# Patient Record
Sex: MALE | URBAN - METROPOLITAN AREA
[De-identification: names, ages, dates, MRNs, and addresses within clinical notes are randomized per-mention and may not be internally consistent; named-entity substitution may affect disease eponyms.]

---

## 2021-11-18 ENCOUNTER — OFFICE VISIT (OUTPATIENT)
Dept: URBAN - METROPOLITAN AREA CLINIC 128 | Facility: CLINIC | Age: 31
End: 2021-11-18
Payer: COMMERCIAL

## 2021-11-18 DIAGNOSIS — K52.9 COLITIS: ICD-10-CM

## 2021-11-18 DIAGNOSIS — R10.84 ABDOMINAL CRAMPING, GENERALIZED: ICD-10-CM

## 2021-11-18 DIAGNOSIS — R19.7 ACUTE DIARRHEA: ICD-10-CM

## 2021-11-18 DIAGNOSIS — R93.5 ABNORMAL ABDOMINAL CT SCAN: ICD-10-CM

## 2021-11-18 PROCEDURE — 99203 OFFICE O/P NEW LOW 30 MIN: CPT | Performed by: INTERNAL MEDICINE

## 2021-11-18 RX ORDER — METRONIDAZOLE 500 MG/1
1 TABLET TABLET ORAL THREE TIMES A DAY
Qty: 30 | OUTPATIENT
Start: 2021-11-18 | End: 2021-11-28

## 2021-11-18 RX ORDER — CIPROFLOXACIN HYDROCHLORIDE 500 MG/1
1 TABLET TABLET, FILM COATED ORAL TWICE A DAY
Qty: 14 TABLET | Refills: 1 | OUTPATIENT
Start: 2021-11-18 | End: 2021-12-02

## 2021-11-18 RX ORDER — MESALAMINE 1.2 G/1
2 TABLETS TABLET, DELAYED RELEASE ORAL ONCE A DAY
Qty: 180 TABLET | Refills: 0 | OUTPATIENT
Start: 2021-11-18 | End: 2022-02-16

## 2021-11-18 NOTE — HPI-TODAY'S VISIT:
Mr. Wood is a pleasant 30yo gentleman with chronic diarrhea for over 9 months, seen in ER 03/2021 with CT positive for colitis of hepatic flexure.  He was seen by [facility] who rec'd colonoscopy but this was not performed.  No stool studies have been performed.  NO overt bleeding except small blood on tissue on occasion.  Diarrhea is assoc with pain and cramping 5-6 times a day, often after meals.  No N/V or weight loss. No new rash or arthritis, eye pain or redness, no pruritis.  No family hx of IBD.  He does take antibiotics several times a year.  There is hx of antibiotics about 8 weeks ago for URI.  He has recurrent UTI for which antibiotics are required as well.  He is s/p appendectomy--no other abdominal surgeries.  No hx of prior C difficile colitis.  No hx of heart, lung, or renal disease.

## 2022-02-02 ENCOUNTER — OFFICE VISIT (OUTPATIENT)
Dept: URBAN - METROPOLITAN AREA SURGERY CENTER 31 | Facility: SURGERY CENTER | Age: 32
End: 2022-02-02

## 2022-02-02 ENCOUNTER — TELEPHONE ENCOUNTER (OUTPATIENT)
Dept: URBAN - METROPOLITAN AREA CLINIC 128 | Facility: CLINIC | Age: 32
End: 2022-02-02

## 2022-02-02 RX ORDER — MESALAMINE 1.2 G/1
2 TABLETS TABLET, DELAYED RELEASE ORAL ONCE A DAY
Qty: 180 TABLET | Refills: 0 | Status: ACTIVE | COMMUNITY
Start: 2021-11-18 | End: 2022-02-16

## 2024-03-20 PROBLEM — 15634181000119107: Status: ACTIVE | Noted: 2024-03-20

## 2024-03-20 PROBLEM — 64226004: Status: ACTIVE | Noted: 2024-03-20

## 2024-03-20 PROBLEM — 197927001: Status: ACTIVE | Noted: 2024-03-20

## 2024-03-20 PROBLEM — 21522001: Status: ACTIVE | Noted: 2024-03-20

## 2024-03-20 PROBLEM — 448661001: Status: ACTIVE | Noted: 2024-03-20

## 2024-03-20 PROBLEM — 448265002: Status: ACTIVE | Noted: 2024-03-20

## 2024-03-20 PROBLEM — 236071009: Status: ACTIVE | Noted: 2024-03-20

## 2024-03-21 ENCOUNTER — LAB (OUTPATIENT)
Dept: URBAN - METROPOLITAN AREA CLINIC 19 | Facility: CLINIC | Age: 34
End: 2024-03-21

## 2024-03-21 ENCOUNTER — OV EP (OUTPATIENT)
Dept: URBAN - METROPOLITAN AREA CLINIC 19 | Facility: CLINIC | Age: 34
End: 2024-03-21
Payer: COMMERCIAL

## 2024-03-21 VITALS
OXYGEN SATURATION: 98 % | SYSTOLIC BLOOD PRESSURE: 130 MMHG | TEMPERATURE: 97.9 F | HEIGHT: 72 IN | DIASTOLIC BLOOD PRESSURE: 80 MMHG | HEART RATE: 67 BPM | WEIGHT: 222.6 LBS | BODY MASS INDEX: 30.15 KG/M2

## 2024-03-21 DIAGNOSIS — K52.9 COLITIS: ICD-10-CM

## 2024-03-21 DIAGNOSIS — R10.811 RIGHT UPPER QUADRANT ABDOMINAL TENDERNESS WITHOUT REBOUND TENDERNESS: ICD-10-CM

## 2024-03-21 DIAGNOSIS — R93.5 ABNORMAL ABDOMINAL CT SCAN: ICD-10-CM

## 2024-03-21 DIAGNOSIS — K52.9 CHRONIC DIARRHEA: ICD-10-CM

## 2024-03-21 DIAGNOSIS — N39.0 RECURRENT UTI: ICD-10-CM

## 2024-03-21 DIAGNOSIS — R10.813 RIGHT LOWER QUADRANT ABDOMINAL TENDERNESS WITHOUT REBOUND TENDERNESS: ICD-10-CM

## 2024-03-21 DIAGNOSIS — R10.9 ABDOMINAL PAIN OF MULTIPLE SITES: ICD-10-CM

## 2024-03-21 PROCEDURE — 99204 OFFICE O/P NEW MOD 45 MIN: CPT | Performed by: STUDENT IN AN ORGANIZED HEALTH CARE EDUCATION/TRAINING PROGRAM

## 2024-03-21 RX ORDER — ONDANSETRON 4 MG/1
1 TABLET ON THE TONGUE AND ALLOW TO DISSOLVE TABLET, ORALLY DISINTEGRATING ORAL ONCE A DAY
Qty: 10 TABLET | Refills: 1 | OUTPATIENT
Start: 2024-03-21

## 2024-03-21 RX ORDER — NAPROXEN 500 MG/1
1 TABLET WITH FOOD OR MILK AS NEEDED TABLET ORAL
Status: ON HOLD | COMMUNITY

## 2024-03-21 RX ORDER — HYOSCYAMINE SULFATE 0.12 MG/1
1 TABLET AS NEEDED TABLET ORAL
Status: ON HOLD | COMMUNITY

## 2024-03-21 NOTE — HPI-TODAY'S VISIT:
3/21/2024:  Bladimirnam: 32 yo M previously seen by my colleague, Dr. Romano for chronic diarrhea and colitis on CT in the hepatic flexure region - for which he was recommended colonoscopy, also seen by [facility] and recommended colonoscopy - also not scheduled presents to clinic to re-establish care.  Today he has the following concerns:  Has been having chronic diarrhea, but now since sunday had nausea.  Non-bilious vomiting.  Streaks of blood noted in stool.  Has a h/o hemorrhoids.  Did blood work per PCP.  CBC, Celiac disease.    Rare alcohol use.  Just sweats with the diarrhea. ========================  11/18/2021:  Rosalina: Mr. Wood is a pleasant 30yo gentleman with chronic diarrhea for over 9 months, seen in ER 03/2021 with CT positive for colitis of hepatic flexure.  He was seen by [facility] who rec'd colonoscopy but this was not performed.  No stool studies have been performed.  NO overt bleeding except small blood on tissue on occasion.  Diarrhea is assoc with pain and cramping 5-6 times a day, often after meals.  No N/V or weight loss. No new rash or arthritis, eye pain or redness, no pruritis.  No family hx of IBD.  He does take antibiotics several times a year.  There is hx of antibiotics about 8 weeks ago for URI.  He has recurrent UTI for which antibiotics are required as well.  He is s/p appendectomy--no other abdominal surgeries.  No hx of prior C difficile colitis.  No hx of heart, lung, or renal disease.  Plan:  CTAP w/c.  Colonoscopy.  Cipro/Flagyl and mesalamine 2.4 g daily.  RTC in 4 weeks. ========================

## 2024-07-08 ENCOUNTER — OFFICE VISIT (OUTPATIENT)
Dept: URBAN - METROPOLITAN AREA SURGERY CENTER 31 | Facility: SURGERY CENTER | Age: 34
End: 2024-07-08

## 2024-07-26 ENCOUNTER — OFFICE VISIT (OUTPATIENT)
Dept: URBAN - METROPOLITAN AREA SURGERY CENTER 31 | Facility: SURGERY CENTER | Age: 34
End: 2024-07-26

## 2024-07-26 RX ORDER — HYOSCYAMINE SULFATE 0.12 MG/1
1 TABLET AS NEEDED TABLET ORAL
Status: ON HOLD | COMMUNITY

## 2024-07-26 RX ORDER — ONDANSETRON 4 MG/1
1 TABLET ON THE TONGUE AND ALLOW TO DISSOLVE TABLET, ORALLY DISINTEGRATING ORAL ONCE A DAY
Qty: 10 TABLET | Refills: 1 | Status: ACTIVE | COMMUNITY
Start: 2024-03-21

## 2024-07-26 RX ORDER — NAPROXEN 500 MG/1
1 TABLET WITH FOOD OR MILK AS NEEDED TABLET ORAL
Status: ON HOLD | COMMUNITY

## 2024-08-16 NOTE — PHYSICAL EXAM GASTROINTESTINAL
Abdomen soft, tender RLQ and RUQ,  nondistended, no masses palpable , normal bowel sounds   Liver and Spleen , no hepatomegaly present, liver edge nontender , spleen not palpable   Rectal: deferred Chief Complaint   Patient presents with    Fall     Trip and fall into pavers approx 1.5 H ago. Takes ASA daily. Lac to L forehead. Denies LOC or blood thinners. Reports also L rib, L knee, L hand pain.     Physical Exam  Pulmonary:      Effort: Pulmonary effort is normal.   Skin:     General: Skin is warm and dry.   Neurological:      Mental Status: He is alert.

## 2024-09-03 ENCOUNTER — OFFICE VISIT (OUTPATIENT)
Dept: URBAN - METROPOLITAN AREA CLINIC 128 | Facility: CLINIC | Age: 34
End: 2024-09-03

## 2024-09-04 ENCOUNTER — OFFICE VISIT (OUTPATIENT)
Dept: URBAN - METROPOLITAN AREA CLINIC 128 | Facility: CLINIC | Age: 34
End: 2024-09-04
Payer: COMMERCIAL

## 2024-09-04 ENCOUNTER — DASHBOARD ENCOUNTERS (OUTPATIENT)
Age: 34
End: 2024-09-04

## 2024-09-04 VITALS
DIASTOLIC BLOOD PRESSURE: 74 MMHG | WEIGHT: 222.4 LBS | TEMPERATURE: 98.6 F | HEIGHT: 72 IN | BODY MASS INDEX: 30.12 KG/M2 | SYSTOLIC BLOOD PRESSURE: 112 MMHG | HEART RATE: 64 BPM

## 2024-09-04 DIAGNOSIS — Z86.010 PERSONAL HISTORY OF COLONIC POLYPS: ICD-10-CM

## 2024-09-04 PROBLEM — 428283002: Status: ACTIVE | Noted: 2024-09-04

## 2024-09-04 PROCEDURE — 99213 OFFICE O/P EST LOW 20 MIN: CPT | Performed by: PHYSICIAN ASSISTANT

## 2024-09-04 RX ORDER — NAPROXEN 500 MG/1
1 TABLET WITH FOOD OR MILK AS NEEDED TABLET ORAL
Status: ACTIVE | COMMUNITY

## 2024-09-04 RX ORDER — ONDANSETRON 4 MG/1
1 TABLET ON THE TONGUE AND ALLOW TO DISSOLVE TABLET, ORALLY DISINTEGRATING ORAL ONCE A DAY
Qty: 10 TABLET | Refills: 1 | Status: ACTIVE | COMMUNITY
Start: 2024-03-21

## 2024-09-04 RX ORDER — HYOSCYAMINE SULFATE 0.12 MG/1
1 TABLET AS NEEDED TABLET ORAL
Status: ACTIVE | COMMUNITY

## 2024-09-04 NOTE — HPI-TODAY'S VISIT:
3/21/2024:  Doretha: 34 yo M previously seen by Dr. Romano for chronic diarrhea and colitis on CT in the hepatic flexure region - for which he was recommended colonoscopy, also seen by [facility] and recommended colonoscopy.  Today he has the following concerns:  Has been having chronic diarrhea, but now since sunday had nausea.  Non-bilious vomiting.  Streaks of blood noted in stool.  Has a h/o hemorrhoids.  Did blood work per PCP.  CBC, Celiac disease.   Rare alcohol use.  Just sweats with the diarrhea. ========================  11/18/2021:  Rosalina: Mr. Wood is a pleasant 30yo gentleman with chronic diarrhea for over 9 months, seen in ER 03/2021 with CT positive for colitis of hepatic flexure.  He was seen by [facility] who rec'd colonoscopy but this was not performed.  No stool studies have been performed.  NO overt bleeding except small blood on tissue on occasion.  Diarrhea is assoc with pain and cramping 5-6 times a day, often after meals.  No N/V or weight loss. No new rash or arthritis, eye pain or redness, no pruritis.  No family hx of IBD.  He does take antibiotics several times a year.  There is hx of antibiotics about 8 weeks ago for URI.  He has recurrent UTI for which antibiotics are required as well.  He is s/p appendectomy--no other abdominal surgeries.  No hx of prior C difficile colitis.  No hx of heart, lung, or renal disease. ========================  2024 EGD and colonoscopy: A) Duodenum second part, biopsySmall bowel mucosa with no significant histopathology.  No histologic evidence of celiac sprue or infectiousmicroorganisms.BSigmoid colon, polypectomyTubular adenoma.CTerminal ileum, biopsySmall bowel mucosa with no significant histopathology.  No histologic evidence of active or chronic ileitis.DRandom colon, biopsyColonic mucosa with no significant histopathology.No histologic evidence of active, chronic or microscopic colitis, recall colonoscopy is due in 5 years. He offers no GI symptoms currently.